# Patient Record
Sex: FEMALE | Race: WHITE | NOT HISPANIC OR LATINO | Employment: OTHER | URBAN - METROPOLITAN AREA
[De-identification: names, ages, dates, MRNs, and addresses within clinical notes are randomized per-mention and may not be internally consistent; named-entity substitution may affect disease eponyms.]

---

## 2021-05-28 ENCOUNTER — HOSPITAL ENCOUNTER (OUTPATIENT)
Facility: CLINIC | Age: 51
End: 2021-05-28
Attending: INTERNAL MEDICINE | Admitting: INTERNAL MEDICINE

## 2021-05-28 DIAGNOSIS — Z11.59 ENCOUNTER FOR SCREENING FOR OTHER VIRAL DISEASES: ICD-10-CM

## 2023-07-17 ENCOUNTER — TRANSFERRED RECORDS (OUTPATIENT)
Dept: HEALTH INFORMATION MANAGEMENT | Facility: CLINIC | Age: 53
End: 2023-07-17
Payer: COMMERCIAL

## 2023-07-17 NOTE — H&P (VIEW-ONLY)
The Pre-Op Tool    Recommendations      Low Risk Procedure    Cardiac History  No history of coronary artery disease    Sleep Apnea  History of obstructive sleep apnea, on CPAP           Labs  Potassium within last 30 days  Creatinine within last 30 days  EKG  Not indicated  Stress Testing  Not indicated    * Testing recommendations are intended to assist, but not direct, clinical decisions.       *    Take 60 units (75% of the usual evening dose) of Glargine (Lantus, Basaglar, Semglee, Rezvoglar) insulin on the evening before the procedure  Hold injectable, non-insulin diabetes medication on the day of the procedure  Hold Furosemide (Lasix) on the morning of procedure.  Hold Aspirin for 7 days prior to procedure  Hold Ibuprofen (Advil) for 2 days prior to the procedure.  Hold vitamins and/or supplements for 1 week prior to the procedure    * Medication recommendations are not intended to be exhaustive; they are limited to common medications that are potentially dangerous if incorrectly managed          Labs  * Data supports elimination of  routine  laboratory testing in favor of focused,  indicated  testing based on medical co-morbidities. A 2009 study randomized 1061 patients undergoing ambulatory, non-cataract surgery to routine or to indicated testing. Perioperative adverse events were similar (Anesthesia & Analgesia 2009;108:467-75; Anesthesiol. Clin. 2016 Mar;34(1):43-58).  EKG  * The ACC/AHA recommends against obtaining routine EKGs in patients undergoing low risk surgeries, a class IIa recommendation (JACC. 2014;64(21);e1-76).  Antiplatelet Therapy  * In the 2014 POISE-2 trial, continuation of aspirin in high cardiovascular risk patients undergoing non-cardiac surgery increased the risk for major bleeding without reducing the rate of death, myocardial infarction, or stroke. In most circumstances our experts recommend a 7 day aspirin hold in patients without coronary stents. Continuation of aspirin may be  reasonable in patients with high risk coronary artery disease or cerebrovascular disease who are not undergoing high bleeding risk procedures (NEJM 2014;370:1494-503; DEBBI 2015; Clin Med 2016; 16: 535-40; Anest Analg 2020; 131: 111-23).     Session ID: [id number]  Endnotes and bibliography available upon request: [email or web address]     Preoperative Consultation     Brissa Smith   : 1970   Gender: female    Date of Encounter: 2023    Nursing Notes:   Bia Stanley  2023 10:56 AM  Signed  Chief Complaint   Patient presents with     Pre-Op Exam     23 - Colonoscopy - [facility], Dr Nava.       Health Maintenance Due   Topic Date Due     Hepatitis B series for Diabetes (1 of 3 - 3-dose series) Never done     Colonoscopy through age 74  Never done     COVID-19 vaccine series (5 - Pfizer series) 2022     Brissa Smith is a 52 y.o. female (1970) who presents for preop evaluation undergoing colonoscopy.    Date of Surgery: 23  Surgical Specialty: Gastroenterology  Paxton Nava MD - [facility]  Hospital/Surgical Facility: [facility]  Fax number: [phone]  Surgery type: outpatient  Primary Physician: Yanci Acosta LPN  2023  10:55 AM         Medical Clearance for surgery is granted after review of surgery type/need, histories and physical performance factors. NOTE: SLEEP APNEA     History of Present Illness     Ms. Smith has come in for a preop for a colonoscopy that is diagnostic for rectal bleeding. She is seeing me for the first time since her hospitalization with fluid overload and renal injury, but she has improved well on those issues.         Beyond preop --   She is reporting much improved home glucose numbers She cannot take metformin as she gets GI distress and insurance previously did  not pay for Jardiance.     We talked about options -- her insurance  now lists Jardiance as preferred. After surgery will decrease insulin to 50-60 untis and add 10 mg of jardiance. Return to clinic in  1 month     Review of Systems   A comprehensive review of systems was negative except for items noted in HPI.    Patient Active Problem List   Diagnosis Code     Esophageal reflux K21.9     Unspecified urinary incontinence R32     Unspecified sleep apnea G47.30     Type 2 diabetes mellitus (HC) E11.9     Elevated blood pressure [id number]     Nasal polyps J33.9     Midline low back pain without sciatica M54.50     Depression F32.A     Acute non-recurrent sinusitis J01.90     Morbid obesity with BMI of 50.0-59.9, adult (HC) E66.01, Z68.43     Somatic dysfunction of lumbar region M99.03     Chronic bilateral low back pain with right-sided sciatica M54.41, G89.29     Thoracic segment dysfunction M99.02     Lacunar stroke (HC) I63.81     Primary osteoarthritis of right knee M17.11     right shoulder rotator cuff tear (supraspinatus, infraspinatus) S46.011A     Impingement syndrome of right shoulder M75.41     Coracoid impingement of right shoulder M75.41     Arthrosis of right acromioclavicular joint M19.011     Leg wound, left S81.802A     Chest pain R07.9     Bipolar disorder (HC) F31.9       Past Surgical History:   . Laterality Date     ESOPHAGOGASTRODUODENOSCOPY WITH BIOPSY N/A 09/20/2019    Dr. Langley @ [facility]     LAPAROSCOPIC CHOLECYSTECTOMY  02/2008     SHOULDER ARTHROSCOPY W/ ROTATOR CUFF REPAIR Right      URETHRAL SLING  2007     WISDOM TEETH EXTRACTION         Social History     Tobacco Use     Smoking status: Never     Smokeless tobacco: Never   Vaping Use     Vaping Use: Never used   Substance Use Topics     Alcohol use: No     Alcohol/week: 0.0 standard drinks of alcohol     Drug use: No     Family History   Problem Relation Age of Onset     Heart Disease Father      Stroke Father      Hypertension Father      Hyperlipidemia Father      Diabetes Father      Heart  "Disease Paternal Grandfather      Diabetes Paternal Grandfather      Cancer Sister         SKin CA     Cancer Maternal Grandfather         Liver Cancer     Cancer-breast No Family History      Cancer-colon No Family History      Cancer-prostate No Family History      Cancer-ovarian No Family History        Current Outpatient Medications   Medication Sig     ALPHA LIPOIC ACID ORAL Take 2 Tablets by mouth once daily in the evening.     aspirin (ECOTRIN) 81 mg enteric coated tablet TAKE 1 TABLET BY MOUTH EVERY DAY WITH A MEAL     atorvastatin (LIPITOR) 80 mg tablet Take 0.5 Tablets (40 mg) by mouth at bedtime.     blood sugar diagnostic (Contour Next Test Strips) strip USE AS DIRECTED TO TEST THREE TO FOUR TIMES DAILY     buPROPion (WELLBUTRIN XL) 300 mg Extended-Release tablet 300 mg every morning     cholecalciferol, vitamin D3, (VITAMIN D3 ORAL) Take 1 Tablet by mouth once daily.     clonazePAM (KLONOPIN) 0.5 mg tablet Take 1 Tablet (0.5 mg) by mouth 2 times daily if needed for Anxiety.     empagliflozin (JARDIANCE) 10 mg tablet Take 1 Tablet (10 mg) by mouth once daily.     exenatide microspheres (BYDUREON) 2 mg/0.65 mL injection Inject 0.65 mL (2 mg) subcutaneous once weekly.     fluticasone (50 mcg per actuation) nasal solution (FLONASE) Inhale 2 Sprays to both nostrils once daily.     furosemide (LASIX) 20 mg tablet Take 0.5 Tablets (10 mg) by mouth once daily.     gabapentin (NEURONTIN) 600 mg tablet 300 mg at bedtime x 1-week, then discontinue.     ibuprofen-acetaminophen (AdviL Dual Action) 125-250 mg tab Take 1 Tablet by mouth once daily if needed.     insulin glargine, U-100, (LANTUS SOLOSTAR) 100 unit/mL (3 mL) pen Inject 80 units subcutaneous before bedtime. Brand: Lantus Solostar     Insulin Needles, Disposable, (Rani Pen Needle) 32 gauge x 5/32\" For administering insulin at home twice daily.     lamoTRIgine (LAMICTAL) 150 mg tablet Take 2 Tablets (300 mg) by mouth at bedtime.     levothyroxine " "(SYNTHROID) 50 mcg tablet TAKE 1 TABLET(50 MCG) BY MOUTH BEFORE BREAKFAST     loratadine (CLARITIN) 10 mg tablet Take 1 Tablet (10 mg) by mouth once daily.     modafiniL (PROVIGIL) 200 mg tablet Take 1 Tablet (200 mg) by mouth every morning.     MULTIVITAMIN & MINERAL FORMULA TAB Take 1 Tablet by mouth once daily in the evening.     omeprazole (PRILOSEC) 20 mg Delayed-Release capsule Take 1 Capsule (20 mg) by mouth once daily before a meal.     ondansetron (ZOFRAN) 4 mg tablet Take 1 Tablet (4 mg) by mouth every 8 hours if needed for Nausea/Vomiting.     oxybutynin XL (DITROPAN XL) 10 mg CR tablet Take 1 Tablet (10 mg) by mouth once daily.     sertraline (ZOLOFT) 100 mg tablet Take 2 Tablets (200 mg) by mouth once daily.     traZODone (DESYREL) 150 mg tablet 75 mg to 150 mg at bedtime     valACYclovir (VALTREX) 500 mg tablet Take 1 Tablet (500 mg) by mouth once daily.     No current facility-administered medications for this visit.     Medications have been reviewed by me and are current to the best of my knowledge and ability.     Allergies   Allergen Reactions     Abilify [Aripiprazole] Hyperglycemia     Metformin Diarrhea and Nausea And Vomiting       PAST DIFFICULTY WITH ANESTHESIA:  None                  Physical Exam     Vitals:    07/17/23 1053   BP: 126/74   Cuff Site: Left Arm   Position: Sitting   Cuff Size: Adult Large   Pulse: 84   Weight: (!) 153.3 kg (338 lb)   Height: 1.702 m (5' 7\")         GEN: Well-developed  female in no apparent distress.  EYE: Pupils are equal, round and reactive to light. Conjunctiva have neither pallor nor icterus.   THROAT: Oropharynx is clear without injection or exudate. No cervical lymphadenopathy, thyroid is within normal limits.   COR: Regular rate and rhythm, without murmur, rub or gallop.  PULM: Clear to auscultation bilaterally; no wheezes, rhonchi or crackles.   ABD: Normal active bowel sounds, no masses or tenderness.    EXT: No cyanosis, clubbing or edema. " Peripheral pulses are palpable and equal bilaterally.   NEURO: Reflexes are 1+ and equal bilaterally in both lower extremities.      Labs: Pending.      ECG: Not indicated     Assessment / Plan       ICD-10-CM    1. Rectal bleeding  K62.5       2. Lacunar stroke (HC)  I63.81 atorvastatin (LIPITOR) 80 mg tablet      3. Bilateral carpal tunnel syndrome  G56.03 AMB CONSULT TO ORTHOPEDICS (NON-SPINE)      4. Trigger thumb of right hand  M65.311 AMB CONSULT TO ORTHOPEDICS (NON-SPINE)      5. Preop exam for internal medicine  Z01.818 BASIC METABOLIC PANEL      6. Chronic left shoulder pain  M25.512 AMB CONSULT TO PHYSICAL THERAPY    G89.29       7. Type 2 diabetes mellitus without complication, without long-term current use of insulin (HC)  E11.9 empagliflozin (JARDIANCE) 10 mg tablet            Yanci Acosta MD     *Some images could not be shown.

## 2023-07-18 ENCOUNTER — HOSPITAL ENCOUNTER (OUTPATIENT)
Facility: CLINIC | Age: 53
Discharge: HOME | End: 2023-07-18
Attending: INTERNAL MEDICINE | Admitting: INTERNAL MEDICINE
Payer: COMMERCIAL

## 2023-07-18 ENCOUNTER — ANESTHESIA EVENT (OUTPATIENT)
Dept: GASTROENTEROLOGY | Facility: CLINIC | Age: 53
End: 2023-07-18
Payer: COMMERCIAL

## 2023-07-18 ENCOUNTER — ANESTHESIA (OUTPATIENT)
Dept: GASTROENTEROLOGY | Facility: CLINIC | Age: 53
End: 2023-07-18
Payer: COMMERCIAL

## 2023-07-18 VITALS
WEIGHT: 293 LBS | OXYGEN SATURATION: 96 % | HEIGHT: 67 IN | SYSTOLIC BLOOD PRESSURE: 118 MMHG | DIASTOLIC BLOOD PRESSURE: 79 MMHG | RESPIRATION RATE: 29 BRPM | HEART RATE: 79 BPM | BODY MASS INDEX: 45.99 KG/M2

## 2023-07-18 LAB
COLONOSCOPY: NORMAL
GLUCOSE BLDC GLUCOMTR-MCNC: 117 MG/DL (ref 70–99)

## 2023-07-18 PROCEDURE — 250N000011 HC RX IP 250 OP 636: Performed by: NURSE ANESTHETIST, CERTIFIED REGISTERED

## 2023-07-18 PROCEDURE — 258N000003 HC RX IP 258 OP 636: Performed by: NURSE ANESTHETIST, CERTIFIED REGISTERED

## 2023-07-18 PROCEDURE — 999N000010 HC STATISTIC ANES STAT CODE-CRNA PER MINUTE: Performed by: INTERNAL MEDICINE

## 2023-07-18 PROCEDURE — 250N000009 HC RX 250: Performed by: NURSE ANESTHETIST, CERTIFIED REGISTERED

## 2023-07-18 PROCEDURE — 82962 GLUCOSE BLOOD TEST: CPT

## 2023-07-18 PROCEDURE — 370N000017 HC ANESTHESIA TECHNICAL FEE, PER MIN: Performed by: INTERNAL MEDICINE

## 2023-07-18 PROCEDURE — 88305 TISSUE EXAM BY PATHOLOGIST: CPT | Mod: TC | Performed by: INTERNAL MEDICINE

## 2023-07-18 PROCEDURE — 45380 COLONOSCOPY AND BIOPSY: CPT | Performed by: INTERNAL MEDICINE

## 2023-07-18 RX ORDER — LORATADINE 10 MG/1
TABLET ORAL
COMMUNITY
Start: 2023-06-22

## 2023-07-18 RX ORDER — LEVOTHYROXINE SODIUM 50 UG/1
TABLET ORAL
COMMUNITY
Start: 2023-05-19

## 2023-07-18 RX ORDER — LIDOCAINE HYDROCHLORIDE 20 MG/ML
INJECTION, SOLUTION INFILTRATION; PERINEURAL PRN
Status: DISCONTINUED | OUTPATIENT
Start: 2023-07-18 | End: 2023-07-18

## 2023-07-18 RX ORDER — SODIUM CHLORIDE, SODIUM LACTATE, POTASSIUM CHLORIDE, CALCIUM CHLORIDE 600; 310; 30; 20 MG/100ML; MG/100ML; MG/100ML; MG/100ML
INJECTION, SOLUTION INTRAVENOUS CONTINUOUS PRN
Status: DISCONTINUED | OUTPATIENT
Start: 2023-07-18 | End: 2023-07-18

## 2023-07-18 RX ORDER — TRAZODONE HYDROCHLORIDE 150 MG/1
TABLET ORAL
COMMUNITY
Start: 2023-07-13

## 2023-07-18 RX ORDER — PROPOFOL 10 MG/ML
INJECTION, EMULSION INTRAVENOUS PRN
Status: DISCONTINUED | OUTPATIENT
Start: 2023-07-18 | End: 2023-07-18

## 2023-07-18 RX ORDER — PROPOFOL 10 MG/ML
INJECTION, EMULSION INTRAVENOUS CONTINUOUS PRN
Status: DISCONTINUED | OUTPATIENT
Start: 2023-07-18 | End: 2023-07-18

## 2023-07-18 RX ORDER — FUROSEMIDE 20 MG
TABLET ORAL
COMMUNITY
Start: 2023-06-04

## 2023-07-18 RX ORDER — ONDANSETRON 2 MG/ML
4 INJECTION INTRAMUSCULAR; INTRAVENOUS
Status: DISCONTINUED | OUTPATIENT
Start: 2023-07-18 | End: 2023-07-18 | Stop reason: HOSPADM

## 2023-07-18 RX ORDER — LIDOCAINE 40 MG/G
CREAM TOPICAL
Status: DISCONTINUED | OUTPATIENT
Start: 2023-07-18 | End: 2023-07-18 | Stop reason: HOSPADM

## 2023-07-18 RX ORDER — ONDANSETRON 2 MG/ML
4 INJECTION INTRAMUSCULAR; INTRAVENOUS EVERY 6 HOURS PRN
Status: DISCONTINUED | OUTPATIENT
Start: 2023-07-18 | End: 2023-07-18 | Stop reason: HOSPADM

## 2023-07-18 RX ORDER — FLUMAZENIL 0.1 MG/ML
0.2 INJECTION, SOLUTION INTRAVENOUS
Status: DISCONTINUED | OUTPATIENT
Start: 2023-07-18 | End: 2023-07-18 | Stop reason: HOSPADM

## 2023-07-18 RX ORDER — NALOXONE HYDROCHLORIDE 0.4 MG/ML
0.2 INJECTION, SOLUTION INTRAMUSCULAR; INTRAVENOUS; SUBCUTANEOUS
Status: DISCONTINUED | OUTPATIENT
Start: 2023-07-18 | End: 2023-07-18 | Stop reason: HOSPADM

## 2023-07-18 RX ORDER — SERTRALINE HYDROCHLORIDE 100 MG/1
TABLET, FILM COATED ORAL
COMMUNITY
Start: 2023-07-17

## 2023-07-18 RX ORDER — ONDANSETRON 2 MG/ML
INJECTION INTRAMUSCULAR; INTRAVENOUS PRN
Status: DISCONTINUED | OUTPATIENT
Start: 2023-07-18 | End: 2023-07-18

## 2023-07-18 RX ORDER — NALOXONE HYDROCHLORIDE 0.4 MG/ML
0.4 INJECTION, SOLUTION INTRAMUSCULAR; INTRAVENOUS; SUBCUTANEOUS
Status: DISCONTINUED | OUTPATIENT
Start: 2023-07-18 | End: 2023-07-18 | Stop reason: HOSPADM

## 2023-07-18 RX ORDER — DEXMEDETOMIDINE HYDROCHLORIDE 4 UG/ML
INJECTION, SOLUTION INTRAVENOUS PRN
Status: DISCONTINUED | OUTPATIENT
Start: 2023-07-18 | End: 2023-07-18

## 2023-07-18 RX ORDER — ASPIRIN 81 MG/1
TABLET, COATED ORAL
COMMUNITY
Start: 2023-07-10

## 2023-07-18 RX ORDER — VALACYCLOVIR HYDROCHLORIDE 500 MG/1
500 TABLET, FILM COATED ORAL
COMMUNITY
Start: 2022-09-08

## 2023-07-18 RX ORDER — CLONAZEPAM 0.5 MG/1
TABLET ORAL
COMMUNITY
Start: 2023-06-04

## 2023-07-18 RX ORDER — PROCHLORPERAZINE MALEATE 10 MG
10 TABLET ORAL EVERY 6 HOURS PRN
Status: DISCONTINUED | OUTPATIENT
Start: 2023-07-18 | End: 2023-07-18 | Stop reason: HOSPADM

## 2023-07-18 RX ORDER — LAMOTRIGINE 150 MG/1
TABLET ORAL
COMMUNITY
Start: 2023-07-13

## 2023-07-18 RX ORDER — BUPROPION HYDROCHLORIDE 300 MG/1
TABLET ORAL
COMMUNITY
Start: 2023-07-13

## 2023-07-18 RX ORDER — ATORVASTATIN CALCIUM 80 MG/1
TABLET, FILM COATED ORAL
COMMUNITY
Start: 2023-07-17

## 2023-07-18 RX ORDER — OXYBUTYNIN CHLORIDE 10 MG/1
TABLET, EXTENDED RELEASE ORAL
COMMUNITY
Start: 2023-06-17

## 2023-07-18 RX ORDER — VITAMIN B COMPLEX
TABLET ORAL DAILY
COMMUNITY

## 2023-07-18 RX ORDER — MULTIPLE VITAMINS W/ MINERALS TAB 9MG-400MCG
1 TAB ORAL DAILY
COMMUNITY

## 2023-07-18 RX ORDER — FLUTICASONE PROPIONATE 50 MCG
2 SPRAY, SUSPENSION (ML) NASAL DAILY
COMMUNITY
Start: 2022-08-14

## 2023-07-18 RX ORDER — ONDANSETRON 4 MG/1
4 TABLET, ORALLY DISINTEGRATING ORAL EVERY 6 HOURS PRN
Status: DISCONTINUED | OUTPATIENT
Start: 2023-07-18 | End: 2023-07-18 | Stop reason: HOSPADM

## 2023-07-18 RX ADMIN — PROPOFOL 150 MCG/KG/MIN: 10 INJECTION, EMULSION INTRAVENOUS at 08:25

## 2023-07-18 RX ADMIN — LIDOCAINE HYDROCHLORIDE 40 MG: 20 INJECTION, SOLUTION INFILTRATION; PERINEURAL at 08:25

## 2023-07-18 RX ADMIN — ONDANSETRON 4 MG: 2 INJECTION INTRAMUSCULAR; INTRAVENOUS at 08:34

## 2023-07-18 RX ADMIN — PHENYLEPHRINE HYDROCHLORIDE 200 MCG: 10 INJECTION INTRAVENOUS at 08:19

## 2023-07-18 RX ADMIN — PROPOFOL 50 MG: 10 INJECTION, EMULSION INTRAVENOUS at 08:26

## 2023-07-18 RX ADMIN — SODIUM CHLORIDE, POTASSIUM CHLORIDE, SODIUM LACTATE AND CALCIUM CHLORIDE: 600; 310; 30; 20 INJECTION, SOLUTION INTRAVENOUS at 08:23

## 2023-07-18 RX ADMIN — DEXMEDETOMIDINE HYDROCHLORIDE 20 MCG: 200 INJECTION INTRAVENOUS at 08:24

## 2023-07-18 RX ADMIN — PHENYLEPHRINE HYDROCHLORIDE 150 MCG: 10 INJECTION INTRAVENOUS at 08:38

## 2023-07-18 RX ADMIN — PHENYLEPHRINE HYDROCHLORIDE 150 MCG: 10 INJECTION INTRAVENOUS at 08:49

## 2023-07-18 ASSESSMENT — ACTIVITIES OF DAILY LIVING (ADL): ADLS_ACUITY_SCORE: 35

## 2023-07-18 NOTE — ANESTHESIA PREPROCEDURE EVALUATION
Anesthesia Pre-Procedure Evaluation    Patient: Brissa Smith   MRN: [id number] : 1970        Procedure : Procedure(s):  Colonoscopy          No past medical history on file.   No past surgical history on file.   No Known Allergies   Social History     Tobacco Use     Smoking status: Not on file     Smokeless tobacco: Not on file   Substance Use Topics     Alcohol use: Not on file      Wt Readings from Last 1 Encounters:   No data found for Wt        Anesthesia Evaluation   Pt has had prior anesthetic.     No history of anesthetic complications       ROS/MED HX  ENT/Pulmonary:     (+) sleep apnea,     Neurologic: Comment: Sciatica   (-) no CVA   Cardiovascular:    (-) CAD   METS/Exercise Tolerance:     Hematologic:       Musculoskeletal:       GI/Hepatic:     (+) GERD,     Renal/Genitourinary:       Endo:     (+) type II DM, Obesity,     Psychiatric/Substance Use:     (+) psychiatric history bipolar     Infectious Disease:       Malignancy:       Other:            Physical Exam    Airway        Mallampati: II   TM distance: > 3 FB   Neck ROM: full   Mouth opening: > 3 cm    Respiratory Devices and Support         Dental       (+) Minor Abnormalities - some fillings, tiny chips      Cardiovascular   cardiovascular exam normal          Pulmonary   pulmonary exam normal                OUTSIDE LABS:  CBC: No results found for: WBC, HGB, HCT, PLT  BMP: No results found for: NA, POTASSIUM, CHLORIDE, CO2, BUN, CR, GLC  COAGS: No results found for: PTT, INR, FIBR  POC: No results found for: BGM, HCG, HCGS  HEPATIC: No results found for: ALBUMIN, PROTTOTAL, ALT, AST, GGT, ALKPHOS, BILITOTAL, BILIDIRECT, ROSANNE  OTHER: No results found for: PH, LACT, A1C, ERMIAS, PHOS, MAG, LIPASE, AMYLASE, TSH, T4, T3, CRP, SED    Anesthesia Plan    ASA Status:  3   NPO Status:  NPO Appropriate    Anesthesia Type: MAC.     - Reason for MAC: straight local not clinically adequate              Consents    Anesthesia Plan(s) and  associated risks, benefits, and realistic alternatives discussed. Questions answered and patient/representative(s) expressed understanding.    - Discussed:     - Discussed with:  Patient         Postoperative Care    Pain management: Multi-modal analgesia.   PONV prophylaxis: Ondansetron (or other 5HT-3), Background Propofol Infusion     Comments:                Lorena Dela Cruz MD, MD

## 2023-07-18 NOTE — ANESTHESIA CARE TRANSFER NOTE
Patient: Brissa Smith    Procedure: Procedure(s):  COLONOSCOPY, WITH POLYPECTOMY AND BIOPSY       Diagnosis: Positive occult stool blood test [R19.5]  Diagnosis Additional Information: No value filed.    Anesthesia Type:   MAC     Note:    Oropharynx: oropharynx clear of all foreign objects and spontaneously breathing  Level of Consciousness: drowsy  Oxygen Supplementation: room air    Independent Airway: airway patency satisfactory and stable  Dentition: dentition unchanged  Vital Signs Stable: post-procedure vital signs reviewed and stable  Report to RN Given: handoff report given  Patient transferred to: PACU    Handoff Report: Identifed the Patient, Identified the Reponsible Provider, Reviewed the pertinent medical history, Discussed the surgical course, Reviewed Intra-OP anesthesia mangement and issues during anesthesia, Set expectations for post-procedure period and Allowed opportunity for questions and acknowledgement of understanding      Vitals:  Vitals Value Taken Time   BP     Temp     Pulse     Resp 29 07/18/23 0900   SpO2 97 % 07/18/23 0900   Vitals shown include unvalidated device data.    Electronically Signed By: GERRY Redman CRNA  July 18, 2023  9:01 AM

## 2023-07-19 LAB
PATH REPORT.COMMENTS IMP SPEC: NORMAL
PATH REPORT.FINAL DX SPEC: NORMAL
PATH REPORT.GROSS SPEC: NORMAL
PATH REPORT.MICROSCOPIC SPEC OTHER STN: NORMAL
PATH REPORT.RELEVANT HX SPEC: NORMAL
PHOTO IMAGE: NORMAL

## 2023-07-19 PROCEDURE — 88305 TISSUE EXAM BY PATHOLOGIST: CPT | Mod: 26 | Performed by: PATHOLOGY
